# Patient Record
Sex: FEMALE | Race: OTHER | NOT HISPANIC OR LATINO
[De-identification: names, ages, dates, MRNs, and addresses within clinical notes are randomized per-mention and may not be internally consistent; named-entity substitution may affect disease eponyms.]

---

## 2017-03-07 PROBLEM — Z00.00 ENCOUNTER FOR PREVENTIVE HEALTH EXAMINATION: Status: ACTIVE | Noted: 2017-03-07

## 2017-03-13 ENCOUNTER — APPOINTMENT (OUTPATIENT)
Dept: OTOLARYNGOLOGY | Facility: CLINIC | Age: 67
End: 2017-03-13

## 2017-03-13 VITALS
HEIGHT: 58 IN | SYSTOLIC BLOOD PRESSURE: 133 MMHG | OXYGEN SATURATION: 97 % | WEIGHT: 126 LBS | TEMPERATURE: 98.5 F | DIASTOLIC BLOOD PRESSURE: 77 MMHG | BODY MASS INDEX: 26.45 KG/M2 | HEART RATE: 79 BPM

## 2017-03-13 DIAGNOSIS — R22.1 LOCALIZED SWELLING, MASS AND LUMP, NECK: ICD-10-CM

## 2017-03-13 DIAGNOSIS — E04.2 NONTOXIC MULTINODULAR GOITER: ICD-10-CM

## 2017-03-13 DIAGNOSIS — I10 ESSENTIAL (PRIMARY) HYPERTENSION: ICD-10-CM

## 2017-03-13 DIAGNOSIS — Z78.9 OTHER SPECIFIED HEALTH STATUS: ICD-10-CM

## 2017-03-13 DIAGNOSIS — Z82.49 FAMILY HISTORY OF ISCHEMIC HEART DISEASE AND OTHER DISEASES OF THE CIRCULATORY SYSTEM: ICD-10-CM

## 2017-03-13 DIAGNOSIS — Z82.3 FAMILY HISTORY OF STROKE: ICD-10-CM

## 2017-03-13 RX ORDER — METOPROLOL SUCCINATE 50 MG/1
50 TABLET, EXTENDED RELEASE ORAL
Refills: 0 | Status: ACTIVE | COMMUNITY

## 2017-03-13 RX ORDER — VALSARTAN 40 MG/1
TABLET, COATED ORAL
Refills: 0 | Status: ACTIVE | COMMUNITY

## 2017-03-13 RX ORDER — ATORVASTATIN CALCIUM 10 MG/1
10 TABLET, FILM COATED ORAL
Refills: 0 | Status: ACTIVE | COMMUNITY

## 2017-03-13 RX ORDER — CHLORTHALIDONE 25 MG/1
25 TABLET ORAL
Refills: 0 | Status: ACTIVE | COMMUNITY

## 2017-04-17 ENCOUNTER — APPOINTMENT (OUTPATIENT)
Dept: OTOLARYNGOLOGY | Facility: CLINIC | Age: 67
End: 2017-04-17

## 2017-05-08 ENCOUNTER — INPATIENT (INPATIENT)
Facility: HOSPITAL | Age: 67
LOS: 0 days | Discharge: HOME | DRG: 204 | End: 2017-05-09
Attending: INTERNAL MEDICINE | Admitting: INTERNAL MEDICINE
Payer: COMMERCIAL

## 2017-05-08 VITALS
SYSTOLIC BLOOD PRESSURE: 142 MMHG | TEMPERATURE: 98 F | RESPIRATION RATE: 18 BRPM | HEART RATE: 97 BPM | DIASTOLIC BLOOD PRESSURE: 84 MMHG | WEIGHT: 126.55 LBS | OXYGEN SATURATION: 100 %

## 2017-05-08 DIAGNOSIS — I25.10 ATHEROSCLEROTIC HEART DISEASE OF NATIVE CORONARY ARTERY WITHOUT ANGINA PECTORIS: Chronic | ICD-10-CM

## 2017-05-08 LAB
APTT BLD: 33.9 SEC — SIGNIFICANT CHANGE UP (ref 27.5–37.4)
BASOPHILS NFR BLD AUTO: 0.4 % — SIGNIFICANT CHANGE UP (ref 0–2)
CK MB CFR SERPL CALC: 1.7 NG/ML — SIGNIFICANT CHANGE UP (ref 0.5–3.6)
CK SERPL-CCNC: 206 U/L — HIGH (ref 26–192)
EOSINOPHIL NFR BLD AUTO: 2.5 % — SIGNIFICANT CHANGE UP (ref 0–6)
EXTRA SST TUBE: SIGNIFICANT CHANGE UP
HCT VFR BLD CALC: 38.1 % — SIGNIFICANT CHANGE UP (ref 34.5–45)
HGB BLD-MCNC: 13.6 G/DL — SIGNIFICANT CHANGE UP (ref 11.5–15.5)
INR BLD: 0.9 — SIGNIFICANT CHANGE UP (ref 0.88–1.16)
LYMPHOCYTES # BLD AUTO: 32.8 % — SIGNIFICANT CHANGE UP (ref 13–44)
MCHC RBC-ENTMCNC: 33 PG — SIGNIFICANT CHANGE UP (ref 27–34)
MCHC RBC-ENTMCNC: 35.7 G/DL — SIGNIFICANT CHANGE UP (ref 32–36)
MCV RBC AUTO: 92.5 FL — SIGNIFICANT CHANGE UP (ref 80–100)
MONOCYTES NFR BLD AUTO: 8.5 % — SIGNIFICANT CHANGE UP (ref 2–14)
NEUTROPHILS NFR BLD AUTO: 55.8 % — SIGNIFICANT CHANGE UP (ref 43–77)
NT-PROBNP SERPL-SCNC: 23 PG/ML — SIGNIFICANT CHANGE UP
PLATELET # BLD AUTO: 315 K/UL — SIGNIFICANT CHANGE UP (ref 150–400)
PROTHROM AB SERPL-ACNC: 10 SEC — SIGNIFICANT CHANGE UP (ref 9.8–12.7)
RBC # BLD: 4.12 M/UL — SIGNIFICANT CHANGE UP (ref 3.8–5.2)
RBC # FLD: 11.7 % — SIGNIFICANT CHANGE UP (ref 10.3–16.9)
TROPONIN I SERPL-MCNC: <0.015 NG/ML — SIGNIFICANT CHANGE UP (ref 0.01–0.04)
WBC # BLD: 5.3 K/UL — SIGNIFICANT CHANGE UP (ref 3.8–10.5)
WBC # FLD AUTO: 5.3 K/UL — SIGNIFICANT CHANGE UP (ref 3.8–10.5)

## 2017-05-08 PROCEDURE — 71010: CPT | Mod: 26

## 2017-05-08 PROCEDURE — 99285 EMERGENCY DEPT VISIT HI MDM: CPT | Mod: 25

## 2017-05-08 PROCEDURE — 71010: CPT | Mod: NC

## 2017-05-08 PROCEDURE — 93458 L HRT ARTERY/VENTRICLE ANGIO: CPT | Mod: 26

## 2017-05-08 PROCEDURE — 93010 ELECTROCARDIOGRAM REPORT: CPT | Mod: NC

## 2017-05-08 RX ORDER — TICAGRELOR 90 MG/1
180 TABLET ORAL ONCE
Qty: 0 | Refills: 0 | Status: DISCONTINUED | OUTPATIENT
Start: 2017-05-08 | End: 2017-05-08

## 2017-05-08 RX ORDER — TICAGRELOR 90 MG/1
180 TABLET ORAL ONCE
Qty: 0 | Refills: 0 | Status: COMPLETED | OUTPATIENT
Start: 2017-05-08 | End: 2017-05-08

## 2017-05-08 RX ORDER — ATORVASTATIN CALCIUM 80 MG/1
10 TABLET, FILM COATED ORAL AT BEDTIME
Qty: 0 | Refills: 0 | Status: DISCONTINUED | OUTPATIENT
Start: 2017-05-08 | End: 2017-05-09

## 2017-05-08 RX ORDER — ATORVASTATIN CALCIUM 80 MG/1
1 TABLET, FILM COATED ORAL
Qty: 0 | Refills: 0 | COMMUNITY

## 2017-05-08 RX ORDER — VALSARTAN 80 MG/1
160 TABLET ORAL DAILY
Qty: 0 | Refills: 0 | Status: DISCONTINUED | OUTPATIENT
Start: 2017-05-08 | End: 2017-05-09

## 2017-05-08 RX ORDER — ASPIRIN/CALCIUM CARB/MAGNESIUM 324 MG
325 TABLET ORAL ONCE
Qty: 0 | Refills: 0 | Status: DISCONTINUED | OUTPATIENT
Start: 2017-05-08 | End: 2017-05-08

## 2017-05-08 RX ORDER — POTASSIUM CHLORIDE 20 MEQ
40 PACKET (EA) ORAL ONCE
Qty: 0 | Refills: 0 | Status: COMPLETED | OUTPATIENT
Start: 2017-05-08 | End: 2017-05-08

## 2017-05-08 RX ORDER — SODIUM CHLORIDE 9 MG/ML
500 INJECTION INTRAMUSCULAR; INTRAVENOUS; SUBCUTANEOUS
Qty: 0 | Refills: 0 | Status: DISCONTINUED | OUTPATIENT
Start: 2017-05-08 | End: 2017-05-08

## 2017-05-08 RX ORDER — VALSARTAN 80 MG/1
1 TABLET ORAL
Qty: 0 | Refills: 0 | COMMUNITY

## 2017-05-08 RX ORDER — SODIUM CHLORIDE 9 MG/ML
500 INJECTION INTRAMUSCULAR; INTRAVENOUS; SUBCUTANEOUS
Qty: 0 | Refills: 0 | Status: DISCONTINUED | OUTPATIENT
Start: 2017-05-08 | End: 2017-05-09

## 2017-05-08 RX ADMIN — Medication 40 MILLIEQUIVALENT(S): at 15:16

## 2017-05-08 RX ADMIN — VALSARTAN 160 MILLIGRAM(S): 80 TABLET ORAL at 22:13

## 2017-05-08 RX ADMIN — SODIUM CHLORIDE 75 MILLILITER(S): 9 INJECTION INTRAMUSCULAR; INTRAVENOUS; SUBCUTANEOUS at 16:28

## 2017-05-08 RX ADMIN — TICAGRELOR 180 MILLIGRAM(S): 90 TABLET ORAL at 14:18

## 2017-05-08 RX ADMIN — ATORVASTATIN CALCIUM 10 MILLIGRAM(S): 80 TABLET, FILM COATED ORAL at 22:13

## 2017-05-08 NOTE — H&P ADULT - FAMILY HISTORY
Mother  Still living? Unknown  Family history of heart attack, Age at diagnosis: Age Unknown     Sibling  Still living? Unknown  Family history of cerebrovascular accident (CVA), Age at diagnosis: Age Unknown

## 2017-05-08 NOTE — H&P ADULT - ASSESSMENT
65 yo Female ASA ALLERGY (anaphylaxis) with hx of HTN, hyperlipidemia, benign thyroid nodules, presented to her Cardiologist Dr. Douglas Hansen complaining of 2-week duration of worsening RICHARDSON (CCS class 3 anginal equivalent) since discontinuing her Metoprolol, she now presents for cardiac catheterization to rule out ACS with the suspicion that her Metoprolol may have been masking CAD.    Of note she is ASA allergic. She was loaded with Brillinta 180mg PO x 1 by the ER time.     ASA 2

## 2017-05-08 NOTE — H&P ADULT - HISTORY OF PRESENT ILLNESS
67 yo Female ASA ALLERGY (anaphylaxis) with hx of HTN, hyperlipidemia, benign thyroid nodules, presented to her Cardiologist Dr. Douglas Hansen complaining of 2-week duration of worsening dyspnea on exertion with 2 city blocks. States previously, she was able to walk "miles" before becoming SOB. The symptoms began shortly after she was discontinued on Metoprolol 50mg PO daily by her MD, as it was causing her to become hypotensive and dizzy.  Of note, she works as an  and states that work has been especially busy lately and she has been under a lot of stress. As per signout, recent office Echo was "normal". Pt denies chest pain, LE edema or calf pain.  Never had hx of DVT or PE.  No recent fever, chills, cough, rectal bleeding.  In light of her worsening RICHARDSON since discontinuing her Metoprolol, she now presents for cardiac catheterization to rule out ACS with the suspicion that her Metoprolol may have been masking CAD.   In the ED EKG revealed NSR @91 BPM with S1Q3T3, /84, RR 18, SpO2 100% on RA. Troponin negative x 1. D-dimer negative, with low clinical suspicion for PE. She was loaded with Brillinta 180mg PO by ER team. Labs notable for Potassium of 3.5, which was repleated with K-dur 40meq PO x1. CXR revealed clear lungs.

## 2017-05-08 NOTE — ED PROVIDER NOTE - MEDICAL DECISION MAKING DETAILS
Pt with s/s noted above.  Well appearing on exam high normal HR, with clear lungs, no signs of DVT and  nonischemic EKG.  DDx includes ACS, low risk PE, anemia, pulmonary dz.  Given above will check labs, ddimer, ekg, cxr, monitor and discuss with cardiologist referring to ED.

## 2017-05-08 NOTE — H&P ADULT - NEGATIVE CARDIOVASCULAR SYMPTOMS
no orthopnea/no palpitations/no peripheral edema/no paroxysmal nocturnal dyspnea/no claudication/no chest pain

## 2017-05-08 NOTE — ED ADULT TRIAGE NOTE - CHIEF COMPLAINT QUOTE
c/o sob on exertion x 2 weeks. Pt reports feeling sob after walking a couple of blocks and was referred by PCP. Denies chest pain or swelling of legs. Breath sounds are clear on auscultation.

## 2017-05-08 NOTE — ED PROVIDER NOTE - OBJECTIVE STATEMENT
65 yo F with hx of HTN, hyperlipidemia presenting with 2 block dyspnea on exertion x 2 weeks following removal of b-blocker.  Pt denies chest pain, LE edema or calf pain.  Never had hx of DVT or PE.  No recent fever, chills, cough, rectal bleeding. 65 yo F with hx of HTN, hyperlipidemia presenting with 2 block dyspnea on exertion x 2 weeks following removal of b-blocker.  Pt denies chest pain, LE edema or calf pain.  Never had hx of DVT or PE.  No recent fever, chills, cough, rectal bleeding.  Sent by cardiologist Dr. Douglas Murray for catheterization of patient concerning story for ACS and beta blocker that may have been masking CAD.

## 2017-05-08 NOTE — H&P ADULT - NSHPPHYSICALEXAM_GEN_ALL_CORE
Appearance: Normal	  HEENT:   Normal oral mucosa, PERRL, EOMI	  Neck: Supple, No Carotid Bruit and 2+ pulses B/L  Cardiovascular: Normal S1 S2, No JVD, No murmurs  Respiratory: Lungs clear to auscultation, No Rales, Rhonchi, Wheezing	  Gastrointestinal:  Obese, Soft, Non-tender, + BS	  Skin: No rashes, No ecchymoses, No cyanosis  Extremities: Normal range of motion, No clubbing, cyanosis or edema  Vascular: Femoral pulses 2+ b/l without bruit, DP 1+ b/l, PT 1+ b/l  Neurologic: Non-focal  Psychiatry: A & O x 3, Mood & affect appropriate

## 2017-05-08 NOTE — H&P ADULT - NSHPLABSRESULTS_GEN_ALL_CORE
Vital Signs Last 24 Hrs  T(C): 36.7, Max: 36.9 (05-08 @ 11:44)  T(F): 98, Max: 98.5 (05-08 @ 11:44)  HR: 92 (90 - 97)  BP: 152/73 (109/53 - 152/73)  BP(mean): --  RR: 18 (18 - 20)  SpO2: 100% (98% - 100%)                          13.6   5.3   )-----------( 315      ( 08 May 2017 12:21 )             38.1       05-08    136  |  102  |  20  ----------------------------<  94  3.5   |  27  |  0.87    Ca    9.3      08 May 2017 12:21    TPro  7.9  /  Alb  4.2  /  TBili  0.6  /  DBili  x   /  AST  31  /  ALT  39  /  AlkPhos  72  05-08      PT/INR - ( 08 May 2017 12:21 )   PT: 10.0 sec;   INR: 0.90          PTT - ( 08 May 2017 12:21 )  PTT:33.9 sec    CARDIAC MARKERS ( 08 May 2017 12:21 )  <0.015 ng/mL / x     / 206 U/L / x     / 1.7 ng/mL        EXAM:  XR CHEST 1 VIEW PORT ROUTINE                          PROCEDURE DATE:  05/08/2017                     INTERPRETATION:  XR CHEST 1 VIEW PORT ROUTINE DATED 5/8/2017 12:37 PM    INDICATION: 66 years-old Female with sob.    PRIOR STUDIES: None    FINDINGS: Single frontal view of the chest. No consolidation, pleural   effusion, or pneumothorax. Cardiomediastinal silhouette and hilar   contours are unremarkable. Degenerative changes of the shoulders and   spine. Right calcific tendinitis/bursitis.    IMPRESSION: Clear lungs.

## 2017-05-09 ENCOUNTER — TRANSCRIPTION ENCOUNTER (OUTPATIENT)
Age: 67
End: 2017-05-09

## 2017-05-09 VITALS
RESPIRATION RATE: 16 BRPM | DIASTOLIC BLOOD PRESSURE: 78 MMHG | SYSTOLIC BLOOD PRESSURE: 136 MMHG | HEART RATE: 94 BPM | OXYGEN SATURATION: 100 %

## 2017-05-09 LAB
ALBUMIN SERPL ELPH-MCNC: 3.7 G/DL — SIGNIFICANT CHANGE UP (ref 3.4–5)
ALP SERPL-CCNC: 63 U/L — SIGNIFICANT CHANGE UP (ref 40–120)
ALT FLD-CCNC: 30 U/L — SIGNIFICANT CHANGE UP (ref 12–42)
ANION GAP SERPL CALC-SCNC: 11 MMOL/L — SIGNIFICANT CHANGE UP (ref 9–16)
AST SERPL-CCNC: 25 U/L — SIGNIFICANT CHANGE UP (ref 15–37)
BILIRUB SERPL-MCNC: 0.9 MG/DL — SIGNIFICANT CHANGE UP (ref 0.2–1.2)
BUN SERPL-MCNC: 18 MG/DL — SIGNIFICANT CHANGE UP (ref 7–23)
CALCIUM SERPL-MCNC: 8.9 MG/DL — SIGNIFICANT CHANGE UP (ref 8.5–10.5)
CHLORIDE SERPL-SCNC: 104 MMOL/L — SIGNIFICANT CHANGE UP (ref 96–108)
CO2 SERPL-SCNC: 22 MMOL/L — SIGNIFICANT CHANGE UP (ref 22–31)
CREAT SERPL-MCNC: 0.82 MG/DL — SIGNIFICANT CHANGE UP (ref 0.5–1.3)
GLUCOSE SERPL-MCNC: 95 MG/DL — SIGNIFICANT CHANGE UP (ref 70–99)
HCT VFR BLD CALC: 39.6 % — SIGNIFICANT CHANGE UP (ref 34.5–45)
HGB BLD-MCNC: 13.8 G/DL — SIGNIFICANT CHANGE UP (ref 11.5–15.5)
MAGNESIUM SERPL-MCNC: 2.5 MG/DL — HIGH (ref 1.6–2.4)
MCHC RBC-ENTMCNC: 32.5 PG — SIGNIFICANT CHANGE UP (ref 27–34)
MCHC RBC-ENTMCNC: 34.8 G/DL — SIGNIFICANT CHANGE UP (ref 32–36)
MCV RBC AUTO: 93.2 FL — SIGNIFICANT CHANGE UP (ref 80–100)
PLATELET # BLD AUTO: 319 K/UL — SIGNIFICANT CHANGE UP (ref 150–400)
POTASSIUM SERPL-MCNC: 3.7 MMOL/L — SIGNIFICANT CHANGE UP (ref 3.5–5.3)
POTASSIUM SERPL-SCNC: 3.7 MMOL/L — SIGNIFICANT CHANGE UP (ref 3.5–5.3)
PROT SERPL-MCNC: 7.1 G/DL — SIGNIFICANT CHANGE UP (ref 6.4–8.2)
RBC # BLD: 4.25 M/UL — SIGNIFICANT CHANGE UP (ref 3.8–5.2)
RBC # FLD: 11.8 % — SIGNIFICANT CHANGE UP (ref 10.3–16.9)
SODIUM SERPL-SCNC: 137 MMOL/L — SIGNIFICANT CHANGE UP (ref 135–145)
WBC # BLD: 6.6 K/UL — SIGNIFICANT CHANGE UP (ref 3.8–10.5)
WBC # FLD AUTO: 6.6 K/UL — SIGNIFICANT CHANGE UP (ref 3.8–10.5)

## 2017-05-09 PROCEDURE — 80053 COMPREHEN METABOLIC PANEL: CPT

## 2017-05-09 PROCEDURE — 71045 X-RAY EXAM CHEST 1 VIEW: CPT

## 2017-05-09 PROCEDURE — 85610 PROTHROMBIN TIME: CPT

## 2017-05-09 PROCEDURE — C1887: CPT

## 2017-05-09 PROCEDURE — 85027 COMPLETE CBC AUTOMATED: CPT

## 2017-05-09 PROCEDURE — 82553 CREATINE MB FRACTION: CPT

## 2017-05-09 PROCEDURE — C1894: CPT

## 2017-05-09 PROCEDURE — 83735 ASSAY OF MAGNESIUM: CPT

## 2017-05-09 PROCEDURE — 83880 ASSAY OF NATRIURETIC PEPTIDE: CPT

## 2017-05-09 PROCEDURE — C1889: CPT

## 2017-05-09 PROCEDURE — C1769: CPT

## 2017-05-09 PROCEDURE — 82550 ASSAY OF CK (CPK): CPT

## 2017-05-09 PROCEDURE — 85379 FIBRIN DEGRADATION QUANT: CPT

## 2017-05-09 PROCEDURE — 36415 COLL VENOUS BLD VENIPUNCTURE: CPT

## 2017-05-09 PROCEDURE — 85025 COMPLETE CBC W/AUTO DIFF WBC: CPT

## 2017-05-09 PROCEDURE — 84484 ASSAY OF TROPONIN QUANT: CPT

## 2017-05-09 PROCEDURE — 85730 THROMBOPLASTIN TIME PARTIAL: CPT

## 2017-05-09 PROCEDURE — 93005 ELECTROCARDIOGRAM TRACING: CPT | Mod: 77

## 2017-05-09 PROCEDURE — 99285 EMERGENCY DEPT VISIT HI MDM: CPT | Mod: 25

## 2017-05-09 RX ORDER — POTASSIUM CHLORIDE 20 MEQ
40 PACKET (EA) ORAL ONCE
Qty: 0 | Refills: 0 | Status: COMPLETED | OUTPATIENT
Start: 2017-05-09 | End: 2017-05-09

## 2017-05-09 RX ADMIN — Medication 40 MILLIEQUIVALENT(S): at 12:41

## 2017-05-09 NOTE — DISCHARGE NOTE ADULT - PLAN OF CARE
You were admitted to [facility] through the emergency room after complaining of shortness of breath. You underwent cardiac catheterization yesterday which revealed that the coronary arteries were all normal. Please continue your medication as prescribed, and please follow up with Dr. Murray. Please continue Valsartan 160 mg orally daily, hydrochlorothiazide 25 mg orally daily Please continue atorvastatin 10 mg orally daily.

## 2017-05-09 NOTE — DISCHARGE NOTE ADULT - MEDICATION SUMMARY - MEDICATIONS TO TAKE
I will START or STAY ON the medications listed below when I get home from the hospital:    valsartan 160 mg oral tablet  -- 1 tab(s) by mouth once a day  -- Indication: For Hypetension    Lipitor 10 mg oral tablet  -- 1 tab(s) by mouth once a day  -- Indication: For Hyperlipidemia    hydroCHLOROthiazide 25 mg oral tablet  -- 1 tab(s) by mouth once a day  -- Indication: For Hypertension

## 2017-05-09 NOTE — DISCHARGE NOTE ADULT - PATIENT PORTAL LINK FT
“You can access the Follow[facility] Patient Portal, offered by [facility], by registering with the following website: [email or web address]”

## 2017-05-09 NOTE — DISCHARGE NOTE ADULT - CARE PLAN
Principal Discharge DX:	Dyspnea on exertion  Goal:	You were admitted to [facility] through the emergency room after complaining of shortness of breath. You underwent cardiac catheterization yesterday which revealed that the coronary arteries were all normal.  Instructions for follow-up, activity and diet:	Please continue your medication as prescribed, and please follow up with Dr. Murray.  Secondary Diagnosis:	HTN (hypertension)  Goal:	Please continue Valsartan 160 mg orally daily, hydrochlorothiazide 25 mg orally daily  Secondary Diagnosis:	Dyslipidemia  Goal:	Please continue atorvastatin 10 mg orally daily. Principal Discharge DX:	Dyspnea on exertion  Goal:	You were admitted to [facility] through the emergency room after complaining of shortness of breath. You underwent cardiac catheterization yesterday which revealed that the coronary arteries were all normal.  Instructions for follow-up, activity and diet:	Please continue your medication as prescribed, and please follow up with Dr. Murray.  Secondary Diagnosis:	HTN (hypertension)  Goal:	Please continue Valsartan 160 mg orally daily, hydrochlorothiazide 25 mg orally daily  Secondary Diagnosis:	Dyslipidemia  Goal:	Please continue atorvastatin 10 mg orally daily.

## 2017-05-09 NOTE — DISCHARGE NOTE ADULT - CARE PROVIDER_API CALL
Douglas Murray (MD), Internal Medicine  [address], [place] [address]  Phone: [phone]  Fax: [phone]

## 2017-05-09 NOTE — DISCHARGE NOTE ADULT - ADDITIONAL INSTRUCTIONS
Please follow up with Dr. Murray this week or next. Please make sure you return to the emergency room if symptoms worsen including not limited to chest pain, palpitations, shortness of breath. You are on medications that you did not want refilled to your pharmacy.

## 2017-05-09 NOTE — DISCHARGE NOTE ADULT - HOSPITAL COURSE
65 yo Female ASA ALLERGY (anaphylaxis) with hx of HTN, hyperlipidemia, benign thyroid nodules, presented to her Cardiologist Dr. Douglas Hansen complaining of 2-week duration of worsening dyspnea on exertion with 2 city blocks. States previously, she was able to walk "miles" before becoming SOB. The symptoms began shortly after she was discontinued on Metoprolol 50mg PO daily by her MD, as it was causing her to become hypotensive and dizzy.  Of note, she works as an  and states that work has been especially busy lately and she has been under a lot of stress. As per signout, recent office Echo was "normal". Pt denies chest pain, LE edema or calf pain.  Never had hx of DVT or PE.  No recent fever, chills, cough, rectal bleeding.  In light of her worsening RICHARDSON since discontinuing her Metoprolol, she now presents for cardiac catheterization to rule out ACS with the suspicion that her Metoprolol may have been masking CAD. In the ED EKG revealed NSR @91 BPM with S1Q3T3, /84, RR 18, SpO2 100% on RA. Troponin negative x 1. D-dimer negative, with low clinical suspicion for PE. She was loaded with Brilinta 180mg PO by ER team. Labs notable for Potassium of 3.5, which was repleted with K-dur 40meq PO x1. CXR revealed clear lungs.   Pt underwent cath 5/8 revealing normal coronaries, EF 75%, groin manual pull sheath, and EDP 4mmHg. Patient was admitted overnight as cath was late case and groin precautions. Patient had no events/complaints overnight. This Am pt seen and examined, denies complaints, PE WNL, Labs sig for K 3.7 repleted. Patient will follow with Dr. Demarco, and seen and examined by attending and deemed stable for discharge. Instructed to return if symptoms worsen including not limited to chest pain, palpitations.     Case discussed with attending.

## 2017-05-09 NOTE — DISCHARGE NOTE ADULT - INSTRUCTIONS
Please continue a diet low in fat, sodium, cholesterol.   You underwent a coronary angiogram and should wait 3 days before returning to ordinary activities. Do not drive for 2 days. Consult your doctor before returning to vigorous activity. You may return to work in 3-5 days. The catheter from your groin was removed and you should remove the dressing in 24 hours. You may shower once the dressing is removed, but avoid baths, hot tubs, or swimming for 5 days to prevent infection. If you notice bleeding from the site, hardening and pain at the site, drainage or redness from the site, coolness/paleness of the extremity, swelling, or fever, please call [phone]. Please continue medication as prescribed unless otherwise indicated by your cardiologist. All of your prescriptions have been sent to the pharmacy. Please follow up with Dr. Murray in 1-2 weeks. All of your needed prescriptions have been sent electronically to your pharmacy.

## 2017-05-11 DIAGNOSIS — E78.5 HYPERLIPIDEMIA, UNSPECIFIED: ICD-10-CM

## 2017-05-11 DIAGNOSIS — I10 ESSENTIAL (PRIMARY) HYPERTENSION: ICD-10-CM

## 2017-05-11 DIAGNOSIS — Z88.8 ALLERGY STATUS TO OTHER DRUGS, MEDICAMENTS AND BIOLOGICAL SUBSTANCES STATUS: ICD-10-CM

## 2017-05-11 DIAGNOSIS — R06.09 OTHER FORMS OF DYSPNEA: ICD-10-CM

## 2017-05-11 DIAGNOSIS — Z82.49 FAMILY HISTORY OF ISCHEMIC HEART DISEASE AND OTHER DISEASES OF THE CIRCULATORY SYSTEM: ICD-10-CM

## 2017-05-11 DIAGNOSIS — Z82.3 FAMILY HISTORY OF STROKE: ICD-10-CM

## 2018-05-15 NOTE — ED ADULT NURSE NOTE - OBJECTIVE STATEMENT
Patient presents to the ED from her cardiologist's office complaining of increased shortness of breath on exertion for the past week, reports that she walks 1-2 blocks and gets short of breath. Reports mild left sided chest tightness on/off. No cardiac history. Denies any fever, chills, cough. Speaking in full sentences , lung sounds clear, equal rise of chest. Placed on the cardiac monitor. sugar

## 2019-11-08 PROBLEM — I10 ESSENTIAL (PRIMARY) HYPERTENSION: Chronic | Status: ACTIVE | Noted: 2017-05-08

## 2019-11-08 PROBLEM — E78.5 HYPERLIPIDEMIA, UNSPECIFIED: Chronic | Status: ACTIVE | Noted: 2017-05-08

## 2019-11-14 ENCOUNTER — APPOINTMENT (OUTPATIENT)
Dept: OTOLARYNGOLOGY | Facility: CLINIC | Age: 69
End: 2019-11-14
Payer: COMMERCIAL

## 2019-11-14 VITALS
SYSTOLIC BLOOD PRESSURE: 147 MMHG | OXYGEN SATURATION: 98 % | BODY MASS INDEX: 26.03 KG/M2 | DIASTOLIC BLOOD PRESSURE: 87 MMHG | HEART RATE: 72 BPM | TEMPERATURE: 98.3 F | WEIGHT: 124 LBS | HEIGHT: 58 IN

## 2019-11-14 DIAGNOSIS — K21.9 ACUTE LARYNGITIS: ICD-10-CM

## 2019-11-14 DIAGNOSIS — D44.0 NEOPLASM OF UNCERTAIN BEHAVIOR OF THYROID GLAND: ICD-10-CM

## 2019-11-14 DIAGNOSIS — J04.0 ACUTE LARYNGITIS: ICD-10-CM

## 2019-11-14 DIAGNOSIS — D49.7 NEOPLASM OF UNSPECIFIED BEHAVIOR OF ENDOCRINE GLANDS AND OTHER PARTS OF NERVOUS SYSTEM: ICD-10-CM

## 2019-11-14 PROCEDURE — 31575 DIAGNOSTIC LARYNGOSCOPY: CPT

## 2019-11-14 PROCEDURE — 99214 OFFICE O/P EST MOD 30 MIN: CPT | Mod: 25

## 2019-11-14 NOTE — HISTORY OF PRESENT ILLNESS
[FreeTextEntry1] : Gianluca returns for a follow up of her thyroid nodules.  Her last US showed a new nodule in the right mid lobe (1.3 cm) without suspicious features.  The dominant and previously biopsied nodule has grown slightly and now measures 3.4 cm up from 2.0 cm in 2017.  She is euthyroid.    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  A follow-up thyroid ultrasound was obtained on 03/01/17 and this demonstrated a mildly enlarged right thyroid lobe measuring 6.4 CM in maximum sagittal height with a dominant lower pole medial isoechoic, solid 2.9 CM nodule increased in size from 2.7 CM.  A subcentimeter interpolar solid nodule was similar measuring 0.9 CM in maximum dimension and another cystic nodule in the upper pole measuring 6 mm. The isthmus was normal. Within the left thyroid lobe, also slightly enlarged, was a dominant, solid, isoechoic nodule in the anterior mid region measuring 2.9 CM, also slightly increased in size from a prior exam in 2016. Because of growth of the bilateral dominant nodules, evaluation with fine needle aspiration biopsy was recommended.  She denies recent voice changes, SOB at rest, dysphagia, neck pain, or neck pressure.  She does have intermittent GERD and had an upper GI endoscopy last year demonstrating mild gastritis and treated for 6 months with a PPI. There is no family history of thyroid cancer, thyroid disease or known radiation exposures.  She has not had recent thyroid function studies or antithyroid antibodies checked.

## 2019-11-14 NOTE — REASON FOR VISIT
[Initial Consultation] : an initial consultation for [FreeTextEntry1] : PCP is Werner Christy MD, Cariologist is Douglas Murray MD and referrer [FreeTextEntry2] : multiple bilateral thyroid nodules detected on Doppler ultrasound.

## 2019-11-14 NOTE — PROCEDURE
[Image(s) Captured] : image(s) captured and filed [Flexible Endoscope] : examined with the flexible endoscope [Topical Lidocaine] : topical lidocaine [Unable to Cooperate with Mirror] : patient unable to cooperate with mirror [Gag Reflex] : gag reflex preventing mirror examination [Oxymetazoline HCl] : oxymetazoline HCl [Serial Number: ___] : Serial Number: [unfilled] [FreeTextEntry3] :    [facility] ULTRASOUND GUIDED FNA BIOPSY REPORT\par \par NAME: ALEXANDER GALVEZ                 MR#  [id number]            : 1950       DATE: 2017\par \par HISTORY/ INDICATIONS: 66- year-old female with bilateral thyroid nodules with some growth over 1 year.\par \par EXAM: Real-time high-resolution ultrasound imaging of the thyroid gland was performed in the longitudinal and transverse planes with color power Doppler supplementation and image capture.\par \par FINDINGS: A right medial lower pole nodule measuring 2.59 x 1.70 x 1.70 cm (longitudinal x AP x transverse) was identified and targeted for USG-FNA.  The nodule had the following ultrasonographic characteristics: solid, isoechoic, smoothly marginated, no punctate echogenic foci, grade 2/3 vascularity on color Doppler, and wider-than-tall.\par \par A second nodule located in the left mid anterior lobe measuring 2.91 x 0.95 x 2.13 cm was targeted for USG-FNA. The nodule had the following ultrasonographic characteristics: Solid, mildly hypoechoic, heterogeneous, well marginated,  with a single punctate echogenic focus, grade 2/3 vascularity on color Doppler, and wider-than-tall.\par \par PROCEDURE: After obtaining informed consent, a time out took place with correct identifiers, procedure reviewed and site.  With the patient lying supine the skin was prepped with alcohol and ~0.5 cc of 1% Lidocaine was injected for local anesthesia. A #25-gauge needle was then passed along the plane of the transducer, positioned within each nodule and confirmed with ultrasonography.  Multiple aspirations were made within each nodule with two separate needle punctures and multiple passes.  Aspirates were smeared on glass slides and also directly placed into both formalin and cytolyt solutions for cytologic analysis, immunohistochemistry, and possible molecular genomic diagnostics.  The patient tolerated the procedure well without complications and was discharged with signed post-procedure instructions indicating the importance of following up on all results. \par \par ASSESSMENT & PLAN: Successful USG-FNA of a right lower pole medial nodule and a left mid-anterior nodule. The patient will be contacted to review the cytologic findings as soon as available for further treatment planning.  A discussion took place with the patient who accepted the responsibility to call the office to review the cytology results if no communication occurs within two weeks.\par \par Electronically signed by ROGERS GUY M.D., FACS on 2017    2:30 PM [de-identified] : The nasal septum is minimally deviated to the left. There are no masses or polyps and the nasal mucosa and secretions are normal. The choanae and posterior nasopharynx are normal without masses or drainage. The Eustachian tube orifices appear patent. The pharynx, including the posterior and lateral pharyngeal walls, the vallecula and base of tongue are normal without ulcerations, lesions or masses. The hypopharynx including the pyriform sinuses open well without pooling of secretions, mucosal lesions or masses. The supraglottic larynx including the epiglottis, petiole, glossoepiglottic folds and pharyngoepiglottic folds are normal without mucosal lesions, ulcerations or masses. The glottis reveals normal false vocal folds. The true vocal folds are glistening white, tense and of equal length, without paralysis, having symmetric mobility on adduction and abduction. There are no mucosal lesions, nodules, cysts, erythroplasia or leukoplakia. The posterior cricoid area has healthy pink mucosa in the interarytenoid area and esophageal inlet. There is slight thickening/edema of the interarytenoid mucosa suggestive of posterior laryngitis from laryngopharyngeal acid reflux disease. The trachea is clear without narrowing, deviation or lesions. \par  [de-identified] : GERD and multiple thyroid nodules.

## 2019-11-14 NOTE — CONSULT LETTER
[Consult Letter:] : I had the pleasure of evaluating your patient, [unfilled]. [Please see my note below.] : Please see my note below. [Consult Closing:] : Thank you very much for allowing me to participate in the care of this patient.  If you have any questions, please do not hesitate to contact me. [Sincerely,] : Sincerely, [DrAlta  ___] : Dr. ALLEN [All Hayes MD, FACS] : All Hayes MD, FACS [Director] : Director [[facility]] : [facility]  [[facility]] : [facility] [[facility]] : [facility]  [Dear  ___] : Dear  [unfilled], [FreeTextEntry3] : \par All Hayes M.D., FACS, [facility]\par Director [facility]\par [facility]\par Certified in Thyroid/Parathyroid/Neck Ultrasound, [facility]/ [facility]\par \par , Department of Otolaryngology\par [facility] [employer] [facility]\par

## 2019-11-14 NOTE — DATA REVIEWED
[de-identified] : see HPI [de-identified] : see HPI [de-identified] : cytology report reviewed

## 2022-04-11 PROBLEM — J04.0 REFLUX LARYNGITIS: Status: ACTIVE | Noted: 2017-03-13
